# Patient Record
Sex: FEMALE | Race: WHITE | NOT HISPANIC OR LATINO | Employment: UNEMPLOYED | URBAN - METROPOLITAN AREA
[De-identification: names, ages, dates, MRNs, and addresses within clinical notes are randomized per-mention and may not be internally consistent; named-entity substitution may affect disease eponyms.]

---

## 2022-08-10 ENCOUNTER — OFFICE VISIT (OUTPATIENT)
Dept: ORTHOPEDIC SURGERY | Facility: CLINIC | Age: 57
End: 2022-08-10

## 2022-08-10 VITALS — WEIGHT: 169.2 LBS | BODY MASS INDEX: 29.98 KG/M2 | HEIGHT: 63 IN

## 2022-08-10 DIAGNOSIS — M25.512 CHRONIC LEFT SHOULDER PAIN: Primary | ICD-10-CM

## 2022-08-10 DIAGNOSIS — G89.29 CHRONIC LEFT SHOULDER PAIN: Primary | ICD-10-CM

## 2022-08-10 PROCEDURE — 99203 OFFICE O/P NEW LOW 30 MIN: CPT | Performed by: FAMILY MEDICINE

## 2022-08-10 RX ORDER — FLUOXETINE 10 MG/1
10 CAPSULE ORAL DAILY
COMMUNITY
Start: 2022-08-01

## 2022-08-10 RX ORDER — ATORVASTATIN CALCIUM 40 MG/1
40 TABLET, FILM COATED ORAL DAILY
COMMUNITY

## 2022-08-10 RX ORDER — METOPROLOL SUCCINATE 50 MG/1
50 TABLET, EXTENDED RELEASE ORAL DAILY
COMMUNITY

## 2022-08-10 RX ORDER — BUMETANIDE 2 MG/1
2 TABLET ORAL DAILY
COMMUNITY
Start: 2022-06-13

## 2022-08-10 RX ORDER — LOSARTAN POTASSIUM 50 MG/1
25 TABLET ORAL DAILY
COMMUNITY
Start: 2022-04-20

## 2022-08-10 RX ORDER — METHYLPHENIDATE HYDROCHLORIDE 5 MG/1
5 TABLET ORAL 2 TIMES DAILY
COMMUNITY
Start: 2022-07-19

## 2022-08-10 RX ORDER — FOLIC ACID 1 MG/1
1 TABLET ORAL DAILY
COMMUNITY
Start: 2022-04-20

## 2022-08-10 RX ORDER — AMIODARONE HYDROCHLORIDE 200 MG/1
200 TABLET ORAL DAILY
COMMUNITY
Start: 2022-07-27

## 2022-08-10 RX ORDER — FAMOTIDINE 20 MG/1
20 TABLET, FILM COATED ORAL DAILY
COMMUNITY
Start: 2022-08-01

## 2022-08-10 RX ORDER — WARFARIN SODIUM 2 MG/1
TABLET ORAL
COMMUNITY
Start: 2022-07-06

## 2022-08-10 RX ORDER — GABAPENTIN 300 MG/1
300 CAPSULE ORAL 2 TIMES DAILY
COMMUNITY
Start: 2022-06-13

## 2022-08-10 RX ORDER — AMLODIPINE BESYLATE 5 MG/1
5 TABLET ORAL DAILY
COMMUNITY

## 2022-08-10 NOTE — PROGRESS NOTES
"Primary Care Sports Medicine Office Visit Note     Patient ID: Maryana Mabry is a 57 y.o. female.    Chief Complaint:  Chief Complaint   Patient presents with   • Left Shoulder - Pain     HPI:    Ms. Maryana Mabry is a 57 y.o. female who is a new patient here for left shoulder pain. She agrees to having a medical student present.    The patient reports that her left shoulder hurts anytime she moves it. She reports it has been bothering her for 2 months. She reports she first noticed it when leaning over her bed to pick something up from the floor.    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 She confirms she is taking coumadin prophylactically. The patient states she takes gabapentin for neuropathy in her left leg following a stroke. She states that she had some residual weakness in her left shoulder following her stroke, but she attended physical therapy and it resolved.    History reviewed. No pertinent past medical history.    History reviewed. No pertinent surgical history.    History reviewed. No pertinent family history.  Social History     Occupational History   • Not on file   Tobacco Use   • Smoking status: Never Smoker   • Smokeless tobacco: Never Used   Vaping Use   • Vaping Use: Never used   Substance and Sexual Activity   • Alcohol use: Never   • Drug use: Never   • Sexual activity: Defer      Review of Systems   Constitutional: Negative for activity change and fever.   Musculoskeletal: Positive for arthralgias.   Skin: Negative for color change and rash.   Neurological: Negative for weakness.     Objective:    Ht 160 cm (63\")   Wt 76.7 kg (169 lb 3.2 oz)   BMI 29.97 kg/m²     Physical Examination:  Physical Exam  Vitals and nursing note reviewed.   Constitutional:       General: She is not in acute distress.     Appearance: She is well-developed. She is not diaphoretic.   HENT:      Head: Normocephalic and atraumatic.   Eyes:      " Conjunctiva/sclera: Conjunctivae normal.   Pulmonary:      Effort: Pulmonary effort is normal. No respiratory distress.   Skin:     General: Skin is warm.      Capillary Refill: Capillary refill takes less than 2 seconds.   Neurological:      Mental Status: She is alert.       Left Shoulder Exam     Comments:  Left shoulder examination: Lateral abduction to about 90 degrees. Passive forward flexxion is about 100 degrees. Negative Shamar, negative resisted external rotation. Negative belly press. Negative Yergason's, mildly positive Speed's. Scarf test is negative.         Imaging and other tests:    Three-view x-ray of the left shoulder today yields very mild acromioclavicular joint osteoarthritic disease, but otherwise glenohumeral joint preservation. Postoperative changes of the chest.    Assessment and Plan:    1. Chronic left shoulder pain  - XR Shoulder 2+ View Left  - Ambulatory Referral to Physical Therapy    2. Adhesive capsulitis of the left shoulder.     3. Left shoulder pain.     I discussed pathology and treatment options with the patient today. We discussed surgical and nonsurgical treatment options for adhesive capsulitis, in which we always prefer conservative physical therapy means. The patient was in agreement with starting physical therapy for stretching and range of motion activity to the left shoulder. We also discussed due to the moderate amount of pain that she's in today, glenohumeral intra-articular glenohumeral injection. This was done under ultrasound guidance and patient tolerated well without complaint or problems. Start physical therapy.    Return to clinic in 3 to 6 months for follow-up evaluation.       Transcribed from ambient dictation for Santos Bolivar II,  by Jaclyn Uribe.  08/10/22   12:18 EDT    Patient verbalized consent to the visit recording.    Disclaimer: Please note that areas of this note were completed with computer voice recognition software.  Quite often  unanticipated grammatical, syntax, homophones, and other interpretive errors are inadvertently transcribed by the computer software. Please excuse any errors that have escaped final proofreading.

## 2022-08-15 ENCOUNTER — TREATMENT (OUTPATIENT)
Dept: PHYSICAL THERAPY | Facility: CLINIC | Age: 57
End: 2022-08-15

## 2022-08-15 DIAGNOSIS — M43.6 STIFF NECK: ICD-10-CM

## 2022-08-15 DIAGNOSIS — G89.29 CHRONIC LEFT SHOULDER PAIN: Primary | ICD-10-CM

## 2022-08-15 DIAGNOSIS — M75.02 ADHESIVE CAPSULITIS OF LEFT SHOULDER: ICD-10-CM

## 2022-08-15 DIAGNOSIS — M25.512 CHRONIC LEFT SHOULDER PAIN: Primary | ICD-10-CM

## 2022-08-15 PROCEDURE — 97162 PT EVAL MOD COMPLEX 30 MIN: CPT | Performed by: PHYSICAL THERAPIST

## 2022-08-15 PROCEDURE — 97110 THERAPEUTIC EXERCISES: CPT | Performed by: PHYSICAL THERAPIST

## 2022-08-15 NOTE — PROGRESS NOTES
Physical Therapy Initial Evaluation and Plan of Care    Patient: Maryana Mabry   : 1965  Diagnosis/ICD-10 Code:  Chronic left shoulder pain [M25.512, G89.29]  Referring practitioner: Santos Bolivar II, DO  Date of Initial Visit: 8/15/2022  Today's Date: 8/15/2022  Patient seen for 1 sessions           Subjective Questionnaire: QuickDASH: 39 = 63.64% limited      Subjective Evaluation    History of Present Illness  Mechanism of injury: Pt reports 2 month hx of L shoulder pain with movement which pt noticed leaning over to pick something up off the floor. Pt had some residual weakness in her L shld after her stroke, but that resolved after prior PT. Pt had GH intra-articular GH injex. OPPT was then ordered. RTC in 3-6 months for follow up.     PMH: anxiety, depression, headaches, ovarian cancer 2006, CHF , paralysis 22, SOB, CVA 8/10/21    PSH: 22 mechanical heart valve after aorta replacement due to aneurysm, C sect    Pain: 6/10 current, 3/10 at best, 10/10 at worst    Aggravating/functional factors: reaching any direction, lifting, carrying, pushing, pulling, washing, dressing, grooming, sleeping, doing laundry    PLOF: some difficulty with the above from prior stroke    Relieving factors: injex     Social Hx: lives with significant other, pt was terminated from the [employer], then she worked at the [employer] at [employer]; pt is trying to get disability, but they denied it      Quality of life: fair    Pain  Quality: sharp  Progression: no change    Hand dominance: right    Treatments  Previous treatment: physical therapy (on legs)  Current treatment: injection treatment  Patient Goals  Patient goals for therapy: decreased pain, increased strength, independence with ADLs/IADLs, return to sport/leisure activities and increased motion  Patient goal: be able to lift more, be able to  grandson (~35#)           Objective          Joint Play   Left Shoulder  Hypomobile in the  posterior capsule, inferior capsule, cervical spine, thoracic spine and long axis distraction.    Tests   Cervical     Left   Negative active compression ([place]), cervical distraction and Spurling's sign.     Right   Negative active compression ([place]), cervical distraction and Spurling's sign.     Right Shoulder   Positive empty can, Hawkin's and painful arc.   Negative drop arm and Speed's.       Observation: reduced scapulothoracic rhythm    Palpation: TTP @ L ant shld    Sensation: intact/equal to LT B UEs    Posture: head fwd, IR/prot shlds    Active Range of Motion (degrees or spinal level)    Cervical  Flexion: 50  Extension: 27  Left lateral flexion: 23  Right lateral flexion: 20  Left rotation: 57  Right rotation: 50      Left Shoulder  Flexion: 105  Extension: 43  Abduction: 80  External Rotation (0 degrees): 35  External Rotation BTH: C5  Internal Rotation BTB: lateral L4/5    Right Shoulder  Grossly WFL       Strength/Myotome Testing     Left Shoulder  Flexion: 4-  Extension: 3-  Abduction: 4-  External Rotation (0 degrees): 2+    Right Shoulder  Flexion: 4  Abduction: 4+  External Rotation (0 degrees): 4+    Left Elbow  Flexion: 4-  Extension: 4    Left Wrist  Flexion: 4  Extension: 4-    Right Wrist  Flex/ext WNL    Finger flex: 4+ R/4- L (pt notes she drops things from L hand since the stroke)    Gait: I without AD but with occasional drop foot/scuffing toe box on L    Assessment & Plan     Assessment  Impairments: abnormal coordination, abnormal muscle firing, abnormal muscle tone, abnormal or restricted ROM, activity intolerance, impaired physical strength, lacks appropriate home exercise program and pain with function    Assessment details: The patient is a 57 y.o. female who presents to physical therapy today for chronic L shoulder pain & symptoms consistent with adhesive capsulitis. Pt also appears with a stiff neck. Upon initial evaluation, the patient demonstrates the following impairments:  pain, reduced posture, decreased ROM/flexibility, strength, and function. Due to these impairments, the patient is unable to/limited with: reaching any direction, lifting, carrying, pushing, pulling, washing, dressing, grooming, sleeping, and doing laundry. The patient would benefit from skilled PT services to address functional limitations and impairments and to improve patient quality of life.        Barriers to therapy: anxiety, depression, headaches, hx cancer, CHF, hx CVA/SOB could affect PT Rx/progress/outcomes if exacerbated/unregulated which could affect tolerance or compliance with PT/HEP  Prognosis: good    Goals  Plan Goals: STGs in 4 weeks:  Decrease pain to 5/10 at worst  Increase AROM L shld elevations and ER at 0 abd by 20 degrees to improve tolerance to reaching for ADLs  Increase UE strength to 4-/5 to improve tolerance to lifting items for ADLs/grandchild    LTGs by discharge  Pt will be able to wash/dress/groom independently and without difficulty or pain  Pt will be able to reach/lift items into/out of an overhead cabinet without difficulty or pain  Pt will be able to lift/carry laundry baskets, garbage/grocery bags, and/or pots/pans without difficulty or pain  Pt will be able to sleep without waking from pain most nights      Plan  Therapy options: will be seen for skilled therapy services  Planned modality interventions: cryotherapy, electrical stimulation/Russian stimulation, thermotherapy (hydrocollator packs), ultrasound, traction and dry needling  Planned therapy interventions: transfer training, therapeutic activities, stretching, strengthening, spinal/joint mobilization, soft tissue mobilization, postural training, neuromuscular re-education, manual therapy, home exercise program, functional ROM exercises, flexibility, body mechanics training, ADL retraining, joint mobilization and fine motor coordination training  Frequency: 3x week  Duration in weeks: 13  Treatment plan discussed with:  patient        History # of Personal Factors and/or Comorbidities: HIGH (3+)  Examination of Body System(s): # of elements: HIGH (4+)  Clinical Presentation: EVOLVING  Clinical Decision Making: MODERATE      Timed:         Manual Therapy:         mins  83764;     Therapeutic Exercise:   12      mins  14295;     Neuromuscular Alvino:        mins  58342;    Therapeutic Activity:          mins  11098;     Gait Training:           mins  39236;     Ultrasound:          mins  91592;    Ionto                                   mins   51139  Self Care                            mins   92928  Canalith Repos         mins 84798      Un-Timed:  Electrical Stimulation:         mins  72113 ( );  Dry Needling          mins self-pay  Traction          mins 55447  Low Eval          Mins  38701  Mod Eval    43      Mins  18322  High Eval                            Mins  55786  Re-Eval                               mins  35689        Timed Treatment:  12    mins   Total Treatment:     55   mins    PT SIGNATURE: Britney Miner, PT   [place] Lic[place] [id number]  DATE TREATMENT INITIATED: 8/15/2022    Initial Certification  Certification Period: 8/15/5605YLRNRFV28/12/2022  I certify that the therapy services are furnished while this patient is under my care.  The services outlined above are required by this patient, and will be reviewed every 90 days.     PHYSICIAN: Santos Bolivar II, DO      DATE:     Please sign and return via fax to [phone]. Thank you, [facility].

## 2022-08-22 ENCOUNTER — TREATMENT (OUTPATIENT)
Dept: PHYSICAL THERAPY | Facility: CLINIC | Age: 57
End: 2022-08-22

## 2022-08-22 DIAGNOSIS — G89.29 CHRONIC LEFT SHOULDER PAIN: Primary | ICD-10-CM

## 2022-08-22 DIAGNOSIS — M43.6 STIFF NECK: ICD-10-CM

## 2022-08-22 DIAGNOSIS — M25.512 CHRONIC LEFT SHOULDER PAIN: Primary | ICD-10-CM

## 2022-08-22 DIAGNOSIS — M75.02 ADHESIVE CAPSULITIS OF LEFT SHOULDER: ICD-10-CM

## 2022-08-22 PROCEDURE — 97110 THERAPEUTIC EXERCISES: CPT | Performed by: PHYSICAL THERAPIST

## 2022-08-22 PROCEDURE — 97530 THERAPEUTIC ACTIVITIES: CPT | Performed by: PHYSICAL THERAPIST

## 2022-08-22 PROCEDURE — 97140 MANUAL THERAPY 1/> REGIONS: CPT | Performed by: PHYSICAL THERAPIST

## 2022-08-22 NOTE — PROGRESS NOTES
Physical Therapy Treatment Note    VISIT#: 2    Subjective   Maryana Mabry reports: no questions on HEP and has been doing ok with it. Pt notes she's getting a little more flexible and pain is 1-2 at the shoulder today. Pt notes she picked up her grand baby yesterday and felt like she was going to drop her, but did not. Pt has had an easier time flipping her towel over her head when she got out of the pool yesterday. Pt does note that she had a fall missing a step last week with impact at her knee and her L ulnar border of her hand.     Objective     PROM L shld (degrees):   Flex 140  Scapt 167  IR 70  ER 77    See Exercise, Manual, and Modality Logs for complete treatment.     Patient Education: cues for therex    Assessment/Plan  Pt is guarded and often holding the arm rigid or holding the arm up while PT is performing manual therapy. Pt with reports of L UT wanting to cramp with scap retr, thus added UT stretch, then continued with retr. Progressed per flow sheet and ended with ice.     Progress per Plan of Care and Progress strengthening /stabilization /functional activity            Timed:    Massage:                            mins  41862       Manual Therapy:   14      mins  51353;     Therapeutic Exercise:   24      mins  98495;     Neuromuscular Alvino:        mins  19029;    Therapeutic Activity:    12     mins  41421;     Gait Training:           mins  04753;     Ultrasound:          mins  42259;    Ionto                                   mins   17096  Self Care                            mins   96778    Un-Timed:  Electrical Stimulation:         mins  24313 ( );  Traction          mins 84200  Canalith Repos                   mins  37660  Dry Needle 1-2 ms      ___  mins 50804  Dry Needle  3+ ms              mins 56475  Low Eval          mins  47255  Mod Eval          Mins  47816  High Eval                            Mins  64294  Re-Eval                               mins  19122    Ice x10'  nc    Timed Treatment:  50    mins   Total Treatment:     60   mins    Britney Miner, PT    Physical Therapist

## 2022-08-24 ENCOUNTER — TREATMENT (OUTPATIENT)
Dept: PHYSICAL THERAPY | Facility: CLINIC | Age: 57
End: 2022-08-24

## 2022-08-24 ENCOUNTER — OFFICE VISIT (OUTPATIENT)
Dept: ORTHOPEDIC SURGERY | Facility: CLINIC | Age: 57
End: 2022-08-24

## 2022-08-24 VITALS — BODY MASS INDEX: 29.16 KG/M2 | WEIGHT: 164.6 LBS | HEIGHT: 63 IN

## 2022-08-24 DIAGNOSIS — S89.92XA INJURY OF LEFT KNEE, INITIAL ENCOUNTER: Primary | ICD-10-CM

## 2022-08-24 DIAGNOSIS — M25.512 CHRONIC LEFT SHOULDER PAIN: Primary | ICD-10-CM

## 2022-08-24 DIAGNOSIS — M43.6 STIFF NECK: ICD-10-CM

## 2022-08-24 DIAGNOSIS — M75.02 ADHESIVE CAPSULITIS OF LEFT SHOULDER: ICD-10-CM

## 2022-08-24 DIAGNOSIS — G89.29 CHRONIC LEFT SHOULDER PAIN: Primary | ICD-10-CM

## 2022-08-24 PROCEDURE — 97140 MANUAL THERAPY 1/> REGIONS: CPT | Performed by: PHYSICAL THERAPIST

## 2022-08-24 PROCEDURE — 97530 THERAPEUTIC ACTIVITIES: CPT | Performed by: PHYSICAL THERAPIST

## 2022-08-24 PROCEDURE — 99213 OFFICE O/P EST LOW 20 MIN: CPT | Performed by: ORTHOPAEDIC SURGERY

## 2022-08-24 PROCEDURE — 97110 THERAPEUTIC EXERCISES: CPT | Performed by: PHYSICAL THERAPIST

## 2022-08-24 NOTE — PROGRESS NOTES
Physical Therapy Treatment Note    VISIT#: 3    Subjective   Maryana Mabry reports: she saw Dr. Muir for her knee. Xrays show no fx. Pt was given a knee brace and will follow up with MD if no improvement in about 4 weeks.     Pt notes the L shoulder is 3-4 at present. Pt tried to reach for something on the floor while in bed with the L arm and aggravated it again. Pt also notes she was trying to work on lifting the arm with a 10# strap on weight.       Objective     AROM (degrees):    Cervical  Left rotation: 57  Right rotation: 50    See Exercise, Manual, and Modality Logs for complete treatment.     Patient Education: cues for therex; discussed not adding weight lifting yet with the shoulder and when we start to begin with lower resistance; ed on how the shoulder stiffens up with frozen shoulder    Assessment/Plan  Pt tolerated session fairly well, but continues to guard during manual. Pt requires cues to relax and 'let the arm drop'. Pt did not require modalities at end of session.     Progress per Plan of Care and Progress strengthening /stabilization /functional activity            Timed:    Massage:                            mins  98846       Manual Therapy:   16      mins  62272;     Therapeutic Exercise:   25      mins  66067;     Neuromuscular Alvino:        mins  35922;    Therapeutic Activity:    15    mins  94493;     Gait Training:           mins  47041;     Ultrasound:          mins  18596;    Ionto                                   mins   44208  Self Care                            mins   18419    Un-Timed:  Electrical Stimulation:         mins  53077 ( );  Traction          mins 28754  Canalith Repos                   mins  94543  Dry Needle 1-2 ms      ___  mins 35484  Dry Needle  3+ ms              mins 32397  Low Eval          mins  19214  Mod Eval          Mins  48248  High Eval                            Mins  93726  Re-Eval                               mins  03705    Timed  Treatment:   56   mins   Total Treatment:     60   mins    Britney Miner, PT    Physical Therapist

## 2022-09-06 PROBLEM — S89.92XA INJURY OF LEFT KNEE: Status: ACTIVE | Noted: 2022-09-06

## 2022-10-11 DIAGNOSIS — S89.92XD INJURY OF LEFT KNEE, SUBSEQUENT ENCOUNTER: Primary | ICD-10-CM

## 2022-10-25 ENCOUNTER — TELEPHONE (OUTPATIENT)
Dept: ORTHOPEDIC SURGERY | Facility: CLINIC | Age: 57
End: 2022-10-25

## 2022-10-25 DIAGNOSIS — S89.92XD INJURY OF LEFT KNEE, SUBSEQUENT ENCOUNTER: Primary | ICD-10-CM

## 2022-11-07 ENCOUNTER — APPOINTMENT (OUTPATIENT)
Dept: MRI IMAGING | Facility: HOSPITAL | Age: 57
End: 2022-11-07

## 2022-12-27 ENCOUNTER — HOSPITAL ENCOUNTER (OUTPATIENT)
Facility: HOSPITAL | Age: 57
Discharge: HOME | End: 2022-12-27
Attending: EMERGENCY MEDICINE | Admitting: EMERGENCY MEDICINE

## 2022-12-27 VITALS
HEART RATE: 57 BPM | SYSTOLIC BLOOD PRESSURE: 155 MMHG | WEIGHT: 163 LBS | HEIGHT: 63 IN | OXYGEN SATURATION: 99 % | TEMPERATURE: 97.8 F | DIASTOLIC BLOOD PRESSURE: 72 MMHG | RESPIRATION RATE: 18 BRPM | BODY MASS INDEX: 28.88 KG/M2

## 2022-12-27 DIAGNOSIS — L03.115 CELLULITIS AND ABSCESS OF RIGHT LEG: Primary | ICD-10-CM

## 2022-12-27 DIAGNOSIS — M79.604 LEG PAIN, ANTERIOR, RIGHT: ICD-10-CM

## 2022-12-27 DIAGNOSIS — L02.415 CELLULITIS AND ABSCESS OF RIGHT LEG: Primary | ICD-10-CM

## 2022-12-27 PROCEDURE — 87205 SMEAR GRAM STAIN: CPT

## 2022-12-27 PROCEDURE — G0463 HOSPITAL OUTPT CLINIC VISIT: HCPCS

## 2022-12-27 PROCEDURE — 87070 CULTURE OTHR SPECIMN AEROBIC: CPT

## 2022-12-27 PROCEDURE — 99204 OFFICE O/P NEW MOD 45 MIN: CPT

## 2022-12-27 PROCEDURE — 87147 CULTURE TYPE IMMUNOLOGIC: CPT

## 2022-12-27 PROCEDURE — 87186 SC STD MICRODIL/AGAR DIL: CPT

## 2022-12-27 RX ORDER — CLINDAMYCIN HYDROCHLORIDE 150 MG/1
450 CAPSULE ORAL 3 TIMES DAILY
Qty: 63 CAPSULE | Refills: 0 | Status: SHIPPED | OUTPATIENT
Start: 2022-12-27 | End: 2023-01-03

## 2022-12-28 NOTE — FSED PROVIDER NOTE
Subjective   History of Present Illness  The patient is a 57 year old female who reports that she has noticed a reddened area on her right lower leg with an open wound. The patient states that she noticed it on Friday and it has progressed since then. She states that she is on Warfarin for atrial fibrillation. She states that the wound has been draining. The patient states that she was shaving and noticed that the scab came off of it. The patient denies fever. She states that she has some mild surrounding pain. The patient is non toxic in appearance.     History provided by:  Patient      Review of Systems   Constitutional: Negative.    HENT: Negative.    Respiratory: Negative.    Cardiovascular: Negative.    Gastrointestinal: Negative.    Genitourinary: Negative.    Musculoskeletal:        Right shin pain   Skin: Positive for wound.        Wound to right lower leg       Past Medical History:   Diagnosis Date   • Hypertension        No Known Allergies    History reviewed. No pertinent surgical history.    History reviewed. No pertinent family history.    Social History     Socioeconomic History   • Marital status:    Tobacco Use   • Smoking status: Never   • Smokeless tobacco: Never   Vaping Use   • Vaping Use: Never used   Substance and Sexual Activity   • Alcohol use: Never   • Drug use: Never   • Sexual activity: Defer           Objective   Physical Exam  Constitutional:       Appearance: Normal appearance.   HENT:      Mouth/Throat:      Mouth: Mucous membranes are moist.   Eyes:      Pupils: Pupils are equal, round, and reactive to light.   Cardiovascular:      Rate and Rhythm: Normal rate and regular rhythm.      Pulses: Normal pulses.   Pulmonary:      Effort: Pulmonary effort is normal. No respiratory distress.      Breath sounds: Normal breath sounds. No wheezing.   Chest:      Chest wall: No tenderness.   Abdominal:      General: Abdomen is flat.   Musculoskeletal:         General: Normal range of  motion.      Cervical back: Normal range of motion.      Right lower leg: No swelling, deformity, lacerations or tenderness.      Left lower leg: No swelling, deformity, lacerations or tenderness.        Legs:    Skin:     General: Skin is warm.      Findings: Erythema present. No laceration.          Neurological:      Mental Status: She is alert.   Psychiatric:         Mood and Affect: Mood normal.         Behavior: Behavior normal.         Procedures           ED Course  ED Course as of 12/27/22 1954 Tue Dec 27, 2022   1917 Dr. Elizabeth at bedside. Wound culture obtained   [KJ]      ED Course User Index  [KJ] Linnette Murry, APRN                                           MDM  Number of Diagnoses or Management Options  Diagnosis management comments: This patient presents with initial presentation of local erythema, warmth, swelling concerning for cellulitis.  Sensitivity/pain to light touch around the erythematous area.  No lymphangitic spread visible  Low c/f osteomyelitis or DVT.  No immune compromise, bullae, pain out of proportion, or rapid progression c/f necrotizing fasciitis.      Final diagnoses:   Cellulitis and abscess of right leg   Leg pain, anterior, right       ED Disposition  ED Disposition     ED Disposition   Discharge    Condition   Stable    Comment   --             Roman Mahan, PAPeteC  [address] [place] [id number] [phone]    In 3 days  For wound re-check         Medication List      New Prescriptions    clindamycin 150 MG capsule  Commonly known as: CLEOCIN  Take 3 capsules by mouth 3 (Three) Times a Day for 7 days.           Where to Get Your Medications      These medications were sent to [id number] - [place], [place] - [address] [place] [address] - [phone]  - [phone] [address] [place] [address]    Hours: 24-hours Phone: [phone]   · clindamycin 150 MG capsule

## 2022-12-28 NOTE — DISCHARGE INSTRUCTIONS
Thank you for letting us care for you today. We will call you with the results of the wound culture. Please take antibiotics as prescribed. Follow up with PCP. Use warm heat to area to help with draining. Return for any fever, increased redness, severe pain, or other worsening symptoms. Keep the area cleaned. Elevate the leg as tolerated.     Take the prescribed antibiotic medicine you are given as directed until it is gone. Take it even if you feel better. It treats the infection and stops it from returning. Not taking all the medicine can make future infections hard to treat.

## 2022-12-30 LAB
BACTERIA SPEC AEROBE CULT: ABNORMAL
GRAM STN SPEC: ABNORMAL
GRAM STN SPEC: ABNORMAL

## 2023-01-23 ENCOUNTER — DOCUMENTATION (OUTPATIENT)
Dept: PHYSICAL THERAPY | Facility: CLINIC | Age: 58
End: 2023-01-23
Payer: COMMERCIAL

## 2023-01-23 NOTE — PROGRESS NOTES
Discharge Summary  Discharge Summary from Physical Therapy Report      Dates  PT visit: 8/15/22-22  Number of Visits: 3     Discharge Status of Patient: pt was seen for 3 visits, then never returned to PT. There was an appt set for 11/10/22, but the provider cancelled that appt and pt never returned. POC has  and pt is therefore discharged.     Goals: Goal status is undetermined as pt never returned to PT after 22.    Discharge Plan: No specific D/C instructions were given as pt never returned to PT after 22.     Comments: pt was given HEP during sessions.     Date of Discharge: 23        Britney Miner, PT  Physical Therapist

## 2024-02-13 ENCOUNTER — OFFICE VISIT (OUTPATIENT)
Dept: ORTHOPEDIC SURGERY | Facility: CLINIC | Age: 59
End: 2024-02-13
Payer: COMMERCIAL

## 2024-02-13 VITALS — WEIGHT: 163 LBS | HEIGHT: 63 IN | BODY MASS INDEX: 28.88 KG/M2 | RESPIRATION RATE: 20 BRPM | OXYGEN SATURATION: 98 %

## 2024-02-13 DIAGNOSIS — M25.561 ACUTE PAIN OF RIGHT KNEE: Primary | ICD-10-CM

## 2024-02-13 RX ADMIN — LIDOCAINE HYDROCHLORIDE 2 ML: 10 INJECTION, SOLUTION INFILTRATION; PERINEURAL at 07:54

## 2024-02-13 RX ADMIN — TRIAMCINOLONE ACETONIDE 80 MG: 40 INJECTION, SUSPENSION INTRA-ARTICULAR; INTRAMUSCULAR at 07:54

## 2024-02-15 RX ORDER — LIDOCAINE HYDROCHLORIDE 10 MG/ML
2 INJECTION, SOLUTION INFILTRATION; PERINEURAL
Status: COMPLETED | OUTPATIENT
Start: 2024-02-13 | End: 2024-02-13

## 2024-02-15 RX ORDER — TRIAMCINOLONE ACETONIDE 40 MG/ML
80 INJECTION, SUSPENSION INTRA-ARTICULAR; INTRAMUSCULAR
Status: COMPLETED | OUTPATIENT
Start: 2024-02-13 | End: 2024-02-13

## 2024-03-05 ENCOUNTER — OFFICE VISIT (OUTPATIENT)
Dept: ORTHOPEDIC SURGERY | Facility: CLINIC | Age: 59
End: 2024-03-05
Payer: COMMERCIAL

## 2024-03-05 VITALS — BODY MASS INDEX: 28.88 KG/M2 | WEIGHT: 163 LBS | HEIGHT: 63 IN | RESPIRATION RATE: 20 BRPM | OXYGEN SATURATION: 98 %

## 2024-03-05 DIAGNOSIS — M25.561 ACUTE PAIN OF RIGHT KNEE: Primary | ICD-10-CM

## 2024-03-05 PROCEDURE — 99213 OFFICE O/P EST LOW 20 MIN: CPT | Performed by: NURSE PRACTITIONER

## 2024-03-05 RX ORDER — METOPROLOL TARTRATE 50 MG/1
TABLET, FILM COATED ORAL
COMMUNITY
Start: 2024-02-22

## 2024-03-05 RX ORDER — MELOXICAM 15 MG/1
15 TABLET ORAL DAILY
Qty: 30 TABLET | Refills: 1 | Status: SHIPPED | OUTPATIENT
Start: 2024-03-05

## 2024-03-05 RX ORDER — CYCLOBENZAPRINE HCL 5 MG
1 TABLET ORAL 3 TIMES DAILY
COMMUNITY
Start: 2024-02-26

## 2024-03-05 RX ORDER — POTASSIUM CHLORIDE 750 MG/1
1 CAPSULE, EXTENDED RELEASE ORAL DAILY
COMMUNITY
Start: 2024-02-10

## 2024-03-05 NOTE — PROGRESS NOTES
FOLLOW UP VISIT        Patient Name: Maryana Mabry  : 1965  Primary Care Physician: Provider, No Known        Chief Complaint:  right knee pain    HPI:   Maryana Mabry is a 58 y.o. year old who presents today for follow up of the above.     At her last visit, she was provided with a steroid injection to the right knee. She notes significant improvement in her symptoms. Still has some mild discomfort - generally when rising from sitting position. No new injuries.       Past Medical/Surgical, Social and Family History:  I have reviewed and/or updated pertinent history as noted in the medical record including:  Past Medical History:   Diagnosis Date    Hypertension      No past surgical history on file.  Social History     Occupational History    Not on file   Tobacco Use    Smoking status: Never     Passive exposure: Never    Smokeless tobacco: Never   Vaping Use    Vaping status: Never Used   Substance and Sexual Activity    Alcohol use: Never    Drug use: Never    Sexual activity: Defer      Social History     Social History Narrative    Not on file     History reviewed. No pertinent family history.    Allergies: No Known Allergies    Medications:   Home Medications:  Current Outpatient Medications on File Prior to Visit   Medication Sig    amiodarone (PACERONE) 200 MG tablet Take 1 tablet by mouth Daily.    amLODIPine (NORVASC) 5 MG tablet Take 1 tablet by mouth Daily.    atorvastatin (LIPITOR) 40 MG tablet Take 1 tablet by mouth Daily.    bumetanide (BUMEX) 2 MG tablet Take 1 tablet by mouth Daily.    cyclobenzaprine (FLEXERIL) 5 MG tablet Take 1 tablet by mouth 3 times a day.    famotidine (PEPCID) 20 MG tablet Take 1 tablet by mouth Daily.    FLUoxetine (PROzac) 10 MG capsule Take 1 capsule by mouth Daily.    folic acid (FOLVITE) 1 MG tablet Take 1 tablet by mouth Daily.    gabapentin (NEURONTIN) 300 MG capsule Take 1 capsule by mouth 2 (Two) Times a Day.    losartan (COZAAR) 50 MG tablet  Take 0.5 tablets by mouth Daily.    methylphenidate (RITALIN) 5 MG tablet Take 1 tablet by mouth 2 (Two) Times a Day.    metoprolol tartrate (LOPRESSOR) 50 MG tablet TAKE 0.5 TABLETS BY MOUTH 2 TIMES DAILY.    potassium chloride (MICRO-K) 10 MEQ CR capsule Take 1 capsule by mouth Daily.    warfarin (COUMADIN) 2 MG tablet TAKE 1 TO 2 TABLETS BY MOUTH EVERY DAY OR AS DIRECTED    [DISCONTINUED] metoprolol succinate XL (TOPROL-XL) 50 MG 24 hr tablet Take 1 tablet by mouth Daily.     No current facility-administered medications on file prior to visit.         ROS:  14 point review of systems was negative except as listed in the HPI     Physical Exam:   58 y.o. female  Body mass index is 28.87 kg/m²., 73.9 kg (163 lb)  Vitals:    03/05/24 1134   Resp: 20   SpO2: 98%         General: Alert, cooperative, appears well and in no observable distress.   HEENT: Normocephalic, atraumatic on external visual inspection. No icterus.   CV: No significant peripheral edema.   Respiratory: Normal respiratory effort.   Skin: Warm & well perfused; appropriate skin turgor.  Psych: Appropriate mood & affect.  Neuro: Gross sensation and motor intact in affected extremity/extremities.               Assessment:  Right knee pain/strain  Body mass index is 28.87 kg/m².  BMI consistent with Overweight: 25.0-29.9kg/m2              Plan:  Symptoms improved overall   Mobic 15 mg po daily  BMI reviewed  Follow up as needed   Patient encouraged to call with questions or concerns in the interim      WENDY Mccracken

## 2024-04-15 RX ORDER — MELOXICAM 15 MG/1
15 TABLET ORAL DAILY
Qty: 30 TABLET | Refills: 1 | Status: SHIPPED | OUTPATIENT
Start: 2024-04-15